# Patient Record
Sex: FEMALE | Race: WHITE
[De-identification: names, ages, dates, MRNs, and addresses within clinical notes are randomized per-mention and may not be internally consistent; named-entity substitution may affect disease eponyms.]

---

## 2017-09-11 ENCOUNTER — HOSPITAL ENCOUNTER (EMERGENCY)
Dept: HOSPITAL 74 - JERFT | Age: 37
Discharge: HOME | End: 2017-09-11
Payer: COMMERCIAL

## 2017-09-11 VITALS — BODY MASS INDEX: 36.3 KG/M2

## 2017-09-11 VITALS — SYSTOLIC BLOOD PRESSURE: 120 MMHG | DIASTOLIC BLOOD PRESSURE: 58 MMHG | TEMPERATURE: 98.2 F | HEART RATE: 83 BPM

## 2017-09-11 DIAGNOSIS — B34.9: Primary | ICD-10-CM

## 2017-09-11 LAB
APPEARANCE UR: CLEAR
BILIRUB UR STRIP.AUTO-MCNC: NEGATIVE MG/DL
COLOR UR: (no result)
KETONES UR QL STRIP: NEGATIVE
NITRITE UR QL STRIP: NEGATIVE
PH UR: 6 [PH] (ref 5–8)
PROT UR QL STRIP: NEGATIVE
PROT UR QL STRIP: NEGATIVE
RBC # UR STRIP: NEGATIVE /UL
SP GR UR: 1.02 (ref 1–1.02)
UROBILINOGEN UR STRIP-MCNC: NEGATIVE MG/DL (ref 0.2–1)

## 2017-09-11 NOTE — PDOC
History of Present Illness





- General


Chief Complaint: Cold Symptoms


Stated Complaint: COUGH, FEVER


Time Seen by Provider: 17 16:11


History Source: Patient


Exam Limitations: No Limitations





- History of Present Illness


Initial Comments: 





17 16:26


36 yr female with c/o cough body aches for 3 days. pt took tylenol yesterday, 

history of asthma no intubations. Pt denies abd pain neg nvd, neg urinary 

complaints. no sick contacts or foreign travel. 


Timing/Duration: reports: other (3 days )


Severity: reports: mild





Past History





- Past Medical History


Allergies/Adverse Reactions: 


 Allergies











Allergy/AdvReac Type Severity Reaction Status Date / Time


 


pineapple [Pineapple] Allergy  Swelling Verified 17 15:26


 


wool Allergy  Rash Uncoded 17 15:26


 


Tape AdvReac  Rash Uncoded 17 15:26











Home Medications: 


Ambulatory Orders





Ibuprofen 600 mg PO TID PRN #25 tablet 17 








Anemia: No


Asthma: Yes


Cancer: No


Cardiac Disorders: No


CVA: No


COPD:  (PLEURISY)


CHF: No


Dementia: No


Diabetes: No


GI Disorders: No


 Disorders: Yes (RENAL COLIC, URINARY STENTS W/ REMOVAL)


HTN: No


Hypercholesterolemia: No


Kidney Stones: Yes


Liver Disease: No


Suicide Attempt (Hx): No


Seizures: No


Thyroid Disease: No





- Surgical History


Abdominal Surgery: Yes


Appendectomy: No


Cardiac Surgery: No


Cholecystectomy: Yes


Lung Surgery: No


Neurologic Surgery: No


Orthopedic Surgery: No





- Reproductive History


LMP comment: 9/3/17


LMP Normal: Yes


Is Patient Pregnant Now?: No


 (#): 7


Para: 6


Therapeutic (s) & number: No


Spontaneous : 0


Uterine Fibroids: Yes (upset at mention of the word hysterectomy. I went to 

further description of)





- Immunization History


Immunization Up to Date: Yes





- Psycho/Social/Smoking Cessation Hx


Anxiety: No


Suicidal Ideation: No


Smoking Status: Yes


Smoking History: Never smoked


Have you smoked in the past 12 months: No


Number of Cigarettes Smoked Daily: 0


'Breaking Loose' booklet given: 13


Hx Alcohol Use: No


Drug/Substance Use Hx: No


Substance Use Type: None


Hx Substance Use Treatment: No





Respiratory Specific PMHX





- Complaint Specific PMHX


Angina: No


Bronchitis: No


Pneumonia: No


Pulmonary Embolus: No


TB (Tuberculosis): No





**Review of Systems





- Review of Systems


Able to Perform ROS?: Yes


Is the patient limited English proficient: No


Constitutional: No: Symptoms Reported


HEENTM: Yes: See HPI


Respiratory: Yes: See HPI





*Physical Exam





- Vital Signs


 Last Vital Signs











Temp Pulse Resp BP Pulse Ox


 


 98.2 F   83   18   120/58   98 


 


 17 15:26  17 15:26  17 15:17 15:17 15:26














- Physical Exam


General Appearance: Yes: Nourished, Appropriately Dressed


HEENT: positive: EOMI, ATA, Normal Voice, TMs Normal, Pharynx Normal.  negative

: Nasal Congestion, Rhinorrhea


Neck: positive: Supple.  negative: Lymphadenopathy (R), Lymphadenopathy (L), 

Tender lateral, Tender midline


Respiratory/Chest: positive: Lungs Clear, Normal Breath Sounds.  negative: 

Chest Tender, Stridor, Wheezing


Cardiovascular: positive: Regular Rhythm, Regular Rate


Gastrointestinal/Abdominal: positive: Normal Bowel Sounds, Soft


Musculoskeletal: positive: Normal Inspection


Extremity: positive: Normal Capillary Refill, Normal Inspection, Normal Range 

of Motion


Integumentary: positive: Normal Color, Dry, Warm


Neurologic: positive: CNs II-XII NML intact, Fully Oriented, Alert, Normal Mood/

Affect, Normal Response, Motor Strength 5/5





Medical Decision Making





- Medical Decision Making





17 16:29


cc: body aches, sore throat, fever


non toxic stable vitals


will check for flu, rapid strep 


motrin for pain 








*DC/Admit/Observation/Transfer


Diagnosis at time of Disposition: 


 Viral syndrome





- Discharge Dispostion


Disposition: HOME


Condition at time of disposition: Good





- Prescriptions


Prescriptions: 


Ibuprofen 600 mg PO TID PRN #25 tablet


 PRN Reason: Fever Or Pain





- Referrals


Referrals: 


[facility] [Provider Group]





- Patient Instructions


Additional Instructions: 


follow with your doctor in 1-2 days for follow up exam


get pleanty of rest


take motrin 600mg every 6hrs for any fever or body aches


increase your vitaminc c level and zinc


return to ER for any worsening symptoms





FLU and strep are negative 





- Post Discharge Activity


Work/School Note:  Back to Work

## 2017-09-14 ENCOUNTER — HOSPITAL ENCOUNTER (EMERGENCY)
Dept: HOSPITAL 74 - JER | Age: 37
Discharge: HOME | End: 2017-09-14
Payer: COMMERCIAL

## 2017-09-14 VITALS — SYSTOLIC BLOOD PRESSURE: 117 MMHG | TEMPERATURE: 99 F | DIASTOLIC BLOOD PRESSURE: 52 MMHG | HEART RATE: 97 BPM

## 2017-09-14 VITALS — BODY MASS INDEX: 36.3 KG/M2

## 2017-09-14 DIAGNOSIS — B95.5: ICD-10-CM

## 2017-09-14 DIAGNOSIS — J02.0: Primary | ICD-10-CM

## 2017-09-14 PROCEDURE — 3E0233Z INTRODUCTION OF ANTI-INFLAMMATORY INTO MUSCLE, PERCUTANEOUS APPROACH: ICD-10-PCS

## 2017-09-14 PROCEDURE — 3E0337Z INTRODUCTION OF ELECTROLYTIC AND WATER BALANCE SUBSTANCE INTO PERIPHERAL VEIN, PERCUTANEOUS APPROACH: ICD-10-PCS

## 2017-09-14 NOTE — PDOC
History of Present Illness





- General


Chief Complaint: Shortness of Breath


Stated Complaint: PAIN, ACUTE


Time Seen by Provider: 17 18:02


History Source: Patient


Exam Limitations: No Limitations





- History of Present Illness


Initial Comments: 





17 18:27


Patient is a 36F with history of asthma (no intubations) here today complaining 

of sore throat. She was seen monday for a sore throat, was strep and flu 

negative, and sent home with instructions to take ibuprofen as needed for pain. 

She has no cough. She endorses fever, and tender cervical pain. She says that it

's painful to swallow, but reports being able to handle her secretions. She 

denies chest pain, shortness of breath, and difficulty with urination.





Past History





- Past Medical History


Allergies/Adverse Reactions: 


 Allergies











Allergy/AdvReac Type Severity Reaction Status Date / Time


 


pineapple [Pineapple] Allergy  Swelling Verified 17 15:26


 


wool Allergy  Rash Uncoded 17 15:26


 


Tape AdvReac  Rash Uncoded 17 15:26











Home Medications: 


Ambulatory Orders





Ibuprofen 600 mg PO TID PRN #25 tablet 17 


Amoxicillin - [Amoxicillin 500mg Capsule -] 500 mg PO BID #19 capsule 17 


Ibuprofen 600 mg PO TID #25 tablet 17 








Anemia: No


Asthma: Yes


Cancer: No


Cardiac Disorders: No


CVA: No


COPD:  (PLEURISY)


CHF: No


Dementia: No


Diabetes: No


GI Disorders: No


 Disorders: Yes (RENAL COLIC, URINARY STENTS W/ REMOVAL)


HTN: No


Hypercholesterolemia: No


Kidney Stones: Yes


Liver Disease: No


Suicide Attempt (Hx): No


Seizures: No


Thyroid Disease: No





- Surgical History


Abdominal Surgery: Yes


Appendectomy: No


Cardiac Surgery: No


Cholecystectomy: Yes


Lung Surgery: No


Neurologic Surgery: No


Orthopedic Surgery: No





- Reproductive History


 (#): 7


Para: 6


Therapeutic (s) & number: No


Spontaneous : 0


Uterine Fibroids: Yes (upset at mention of the word hysterectomy. I went to 

further description of)





- Immunization History


Immunization Up to Date: Yes





- Psycho/Social/Smoking Cessation Hx


Anxiety: No


Suicidal Ideation: No


Smoking Status: Yes


Smoking History: Never smoked


Have you smoked in the past 12 months: No


Number of Cigarettes Smoked Daily: 0


Information on smoking cessation initiated: No


'Breaking Loose' booklet given: 13


Hx Alcohol Use: No


Drug/Substance Use Hx: No


Substance Use Type: None


Hx Substance Use Treatment: No





**Review of Systems





- Review of Systems


Comments:: 





17 18:35


GENERAL/CONSTITUTIONAL: Positive for fever. No weakness.


HEAD, EYES, EARS, NOSE AND THROAT: No change in vision. Positive for sore throat


CARDIOVASCULAR: No chest pain or shortness of breath


RESPIRATORY: No cough, wheezing, or hemoptysis.


GASTROINTESTINAL: Positive for nausea. Negative for vomiting, diarrhea or 

constipation.


GENITOURINARY: No dysuria, frequency, or change in urination.


MUSCULOSKELETAL: Positive for general body aches. No focal joint pain.


SKIN: No rash


NEUROLOGIC: Positive for headache. Negative for vertigo, loss of consciousness, 

or change in strength/sensation.


ENDOCRINE: No increased thirst. No abnormal weight change


HEMATOLOGIC/LYMPHATIC: No anemia, easy bleeding, or history of blood clots.


ALLERGIC/IMMUNOLOGIC: No hives or skin allergy.











*Physical Exam





- Vital Signs


 Last Vital Signs











Temp Pulse Resp BP Pulse Ox


 


 99.2 F   105 H  16   139/86   98 


 


 17 16:45  17 16:45  17 16:45  17 16:45  17 16:45














- Physical Exam


Comments: 





17 18:37


GENERAL: Awake, alert, and fully oriented, in no acute distress


HEAD: No signs of trauma, normocephalic, atraumatic


EYES: PERRLA, EOMI, sclera anicteric, conjunctiva clear


ENT: Auricles normal inspection, hearing grossly normal, nares patent, 

oropharynx has erythema and exudate, uvula midline


NECK: Normal ROM, tender lymphadenopathy, JVD, or masses


LUNGS: No distress, speaks full sentences, clear to auscultation bilaterally


HEART: Regular rate and rhythm, normal S1 and S2, no murmurs, rubs or gallops, 

peripheral pulses normal and equal bilaterally.


ABDOMEN: Soft, nontender, normoactive bowel sounds.  No guarding, no rebound.  

No masses


EXTREMITIES: Normal inspection, Normal range of motion, no edema.  No clubbing 

or cyanosis.


NEUROLOGICAL: Cranial nerves II through XII grossly intact.  Normal speech, 

normal gait, no focal sensorimotor deficits


SKIN: Warm, Dry, normal turgor, no rashes or lesions noted.














Medical Decision Making





- Medical Decision Making


17 18:38


36F with history of asthma here today with sore throat. Tachy to 105, vital 

signs otherwise normal and stable. Will get repeat strep culture. Will give 

toradol im and amoxicillin for CENTOR score of 4. 





17 23:14


Patient given 1L of NS. Now says throat pain has improved to the point where 

she can eat. Feels better. Will discharge home with amoxicillin and ibuprofen.





*DC/Admit/Observation/Transfer


Diagnosis at time of Disposition: 


 Strep pharyngitis





- Discharge Dispostion


Disposition: HOME


Condition at time of disposition: Good


Admit: No





- Prescriptions


Prescriptions: 


Amoxicillin - [Amoxicillin 500mg Capsule -] 500 mg PO BID #19 capsule


Ibuprofen 600 mg PO TID #25 tablet





- Patient Instructions


Printed Discharge Instructions:  DI for Strep Throat


Additional Instructions: 


Your prescription for ibuprofen was sent to [facility] on Monday. Please pick 

that up with your antibiotics.

## 2017-09-14 NOTE — PDOC
Attending Attestation





- Resident


Resident Name: Sheldon Andrade





- ED Attending Attestation


I have performed the following: I have examined & evaluated the patient, The 

case was reviewed & discussed with the resident, I agree w/resident's findings 

& plan, Exceptions are as noted





- HPI


HPI: 





09/14/17 18:31


Healthy 36-year-old female with a past medical history seen here previously for 

throat pain, rapid strep and influenza swabs at that time was negative so she 

was discharged. Now presents with worsening throat pain and chills and painful 

swallowing, no difficulty breathing or managing her secretions or speaking. No 

history of recurring pharyngitis.





- Physicial Exam


PE: 





09/14/17 18:32


Low-grade fever, low-grade tachycardia, otherwise well-appearing seated in 

stretcher


Speaking full sentences, clear voice, no stridor, managing secretions


Mild left tonsillar swelling with exudates, uvula midline, positive 

submandibular and anterior cervical lymphadenopathy





- Medical Decision Making





09/14/17 18:32


Patient seen and evaluated with the resident. I agree with the overall 

evaluation, assessment, and management with the following summary of visit:





36-year-old female with clinical presentation consistent with bacterial 

pharyngitis, low-grade fever and tachycardia. Not ill appearing, 

hemodynamically stable, airway patent.


NSAIDs and Tylenol for pain/fever


Throat culture sent again


Start antibiotics


Understands return criteria

## 2017-09-26 ENCOUNTER — HOSPITAL ENCOUNTER (EMERGENCY)
Dept: HOSPITAL 74 - JERFT | Age: 37
Discharge: HOME | End: 2017-09-26
Payer: COMMERCIAL

## 2017-09-26 VITALS — BODY MASS INDEX: 36.3 KG/M2

## 2017-09-26 VITALS — SYSTOLIC BLOOD PRESSURE: 135 MMHG | DIASTOLIC BLOOD PRESSURE: 78 MMHG | TEMPERATURE: 98.2 F | HEART RATE: 89 BPM

## 2017-09-26 DIAGNOSIS — N76.0: Primary | ICD-10-CM

## 2017-09-26 DIAGNOSIS — T36.0X5A: ICD-10-CM
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
## 2017-09-26 NOTE — PDOC
History of Present Illness





- General


Chief Complaint: Vaginal Sxs


Stated Complaint: ALLERGIC REACTION


Time Seen by Provider: 17 18:07


History Source: Patient


Exam Limitations: No Limitations





- History of Present Illness


Initial Comments: 





17 18:34


Patient is a 36-year-old female currently under treatment with amoxicillin for 

strep throat presents with vaginal itching and discharge.





Past Medical History: Denies.  





Allergies: No known allergies





Medications: Currently on amoxicillin





Family History: Non-contributory





Social History: Denies smoking, alcohol use, or IVDU





Review of Systems


GENERAL/CONSTITUTIONAL: No fever or chills. No weakness. No weight change.


HEAD, EYES, EARS, NOSE AND THROAT: No change in vision. No ear pain or 

discharge. No sore throat. 


CARDIOVASCULAR: No chest pain or shortness of breath.


RESPIRATORY: No cough, wheezing, or hemoptysis.


GASTROINTESTINAL: No nausea, vomiting, diarrhea or constipation. No rectal 

bleeding.


GENITOURINARY: No dysuria, frequency, or change in urination. Vaginal discharge

, external itching


MUSCULOSKELETAL: No joint or muscle swelling or pain. No neck or back pain.


SKIN: No rash or easy bruising.


NEUROLOGIC: No headache, vertigo, loss of consciousness, or loss of sensation.


ENDOCRINE: No increased thirst. No abnormal weight change.


HEMATOLOGIC/LYMPHATIC: No anemia, easy bleeding, or history of blood clots.


ALLERGIC/IMMUNOLOGIC: No hives or skin allergy. No latex allergy.





Physical Exam: 


GENERAL: The patient is awake, alert, and fully oriented, in no acute distress.


EYES: Pupils equal, round and reactive to light, extraocular movements intact, 

sclera anicteric, conjunctiva clear.


ENT: Ears normal, nares patent, oropharynx clear without exudates.  Moist 

mucous membranes. No uvula deviation


NECK: Normal range of motion, supple without lymphadenopathy, JVD, or masses.


LUNGS: Breath sounds equal, clear to auscultation bilaterally.  No wheezes, and 

no crackles.


HEART: Regular rate and rhythm, normal S1 and S2 without murmur, rub or gallop.


ABDOMEN: Soft, nontender, normoactive bowel sounds.  No guarding, no rebound.  

No masses. No bruising or abrasions


GENITALIA: External labia is erythematous without lesions, there is a white non-

foul odor discharge, cervical os is closed, no CMT.


MUSCULOSKELETAL: Normal range of motion, no edema.  No clubbing or cyanosis. No 

cords, erythema, or tenderness. No CVA Tenderness with fist.


NEUROLOGICAL: Cranial nerves II through XII grossly intact.  Normal speech, 

normal gait.


SKIN: Warm, Dry, normal turgor, no rashes or lesions noted.





Past History





- Past Medical History


Allergies/Adverse Reactions: 


 Allergies











Allergy/AdvReac Type Severity Reaction Status Date / Time


 


pineapple [Pineapple] Allergy  Swelling Verified 17 15:26


 


wool Allergy  Rash Uncoded 17 15:26


 


Tape AdvReac  Rash Uncoded 17 15:26











Home Medications: 


Ambulatory Orders





Amoxicillin - [Amoxicillin 500mg Capsule -] 500 mg PO BID #19 capsule 17 


Benzocaine/Resorcinol [Vagisil Cream] 30 gm TP BID #1 tube 17 


Fluconazole [Diflucan] 150 mg PO ONCE #1 tablet 17 








Anemia: No


Asthma: Yes


Cancer: No


Cardiac Disorders: No


CVA: No


COPD:  (PLEURISY)


CHF: No


Dementia: No


Diabetes: No


GI Disorders: No


 Disorders: Yes (RENAL COLIC, URINARY STENTS W/ REMOVAL)


HTN: No


Hypercholesterolemia: No


Kidney Stones: Yes


Liver Disease: No


Seizures: No


Thyroid Disease: No





- Surgical History


Abdominal Surgery: Yes


Appendectomy: No


Cardiac Surgery: No


Cholecystectomy: Yes


Lung Surgery: No


Neurologic Surgery: No


Orthopedic Surgery: No





- Reproductive History


 (#): 7


Para: 6


Therapeutic (s) & number: No


Spontaneous : 0


Uterine Fibroids: Yes (upset at mention of the word hysterectomy. I went to 

further description of)





- Immunization History


Immunization Up to Date: Yes





- Suicide/Smoking/Psychosocial Hx


Smoking Status: Yes


Smoking History: Never smoked


Have you smoked in the past 12 months: No


Number of Cigarettes Smoked Daily: 0


Information on smoking cessation initiated: No


'Breaking Loose' booklet given: 13


Hx Alcohol Use: No


Drug/Substance Use Hx: No


Substance Use Type: None


Hx Substance Use Treatment: No





*Physical Exam





- Vital Signs


 Last Vital Signs











Temp Pulse Resp BP Pulse Ox


 


 98.2 F   89   16   135/78   98 


 


 17 17:38  17 17:38  17 17:38  17 17:38  17 17:38














Medical Decision Making





- Medical Decision Making





17 18:36


A/P: Patient with vaginitis from antibiotic use will DC patient home with 

prescription for vagisill external cream and Diflucan, continue antibiotic 

treatment, follow-up with OB/GYN of symptoms persist in 3 days.





*DC/Admit/Observation/Transfer


Diagnosis at time of Disposition: 


Vaginitis


Qualifiers:


 Chronicity: acute Qualified Code(s): N76.0 - Acute vaginitis





- Discharge Dispostion


Disposition: HOME


Condition at time of disposition: Good


Admit: No





- Prescriptions


Prescriptions: 


Fluconazole [Diflucan] 150 mg PO ONCE #1 tablet


Benzocaine/Resorcinol [Vagisil Cream] 30 gm TP BID #1 tube





- Referrals


Referrals: 


OB,GYN [Other]





- Patient Instructions


Printed Discharge Instructions:  Atrophic Vaginitis


Additional Instructions: 


Please take medication as prescribed and apply topical cream to stop itching.


cool compresses to affected area





- Post Discharge Activity


Forms/Work/School Notes:  Back to Work

## 2017-12-30 ENCOUNTER — HOSPITAL ENCOUNTER (EMERGENCY)
Dept: HOSPITAL 74 - JER | Age: 37
Discharge: HOME | End: 2017-12-30
Payer: COMMERCIAL

## 2017-12-30 VITALS — DIASTOLIC BLOOD PRESSURE: 76 MMHG | SYSTOLIC BLOOD PRESSURE: 137 MMHG | TEMPERATURE: 97.8 F | HEART RATE: 78 BPM

## 2017-12-30 VITALS — BODY MASS INDEX: 39.4 KG/M2

## 2017-12-30 DIAGNOSIS — R09.1: ICD-10-CM

## 2017-12-30 DIAGNOSIS — J02.9: Primary | ICD-10-CM

## 2017-12-30 DIAGNOSIS — B97.89: ICD-10-CM

## 2017-12-30 DIAGNOSIS — J44.9: ICD-10-CM

## 2017-12-30 PROCEDURE — 3E0F7GC INTRODUCTION OF OTHER THERAPEUTIC SUBSTANCE INTO RESPIRATORY TRACT, VIA NATURAL OR ARTIFICIAL OPENING: ICD-10-PCS

## 2017-12-30 NOTE — PDOC
History of Present Illness





- General


History Source: Patient


Exam Limitations: No Limitations





- History of Present Illness


Initial Comments: 





17 14:25


The patient is a 37 year old female with a significant PMH of asthma and 

obesity who presents to the emergency department with cold-like symptoms 

beginning approximately 4 days ago. The patient reports dry cough, sore throat, 

chills, subjective fever and body aches over this 4 day span. The patient 

denies sick contacts or recent travel. 





The patient denies chest pain, shortness of breath, headache and dizziness. 


Denies nausea, vomit, diarrhea and constipation. 


Denies dysuria, frequency, urgency and hematuria.





Allergies: NKDA 


Past surgical history: Cholecystectomy.


Social history: No reported cigarette, alcohol, or drug use.


PCP: None reported.








<Gage Mosher - Last Filed: 17 14:33>





- General


History Source: Patient


Exam Limitations: No Limitations





<Gage Tolbert - Last Filed: 17 15:24>





- General


Chief Complaint: Cold Symptoms


Stated Complaint: COUGH


Time Seen by Provider: 17 12:59





Past History





<Gage Mosher - Last Filed: 17 14:33>





- Past Medical History


Anemia: No


Asthma: Yes


Cancer: No


Cardiac Disorders: No


CVA: No


COPD:  (PLEURISY)


CHF: No


Dementia: No


Diabetes: No


GI Disorders: No


 Disorders: Yes (RENAL COLIC, URINARY STENTS W/ REMOVAL)


HTN: No


Hypercholesterolemia: No


Kidney Stones: Yes


Liver Disease: No


Seizures: No


Thyroid Disease: No





- Surgical History


Abdominal Surgery: No


Appendectomy: No


Cardiac Surgery: No


Cholecystectomy: Yes


Lung Surgery: No


Neurologic Surgery: No


Orthopedic Surgery: No





- Reproductive History


 (#): 7


Para: 6


Therapeutic (s) & number: No


Spontaneous : 0


Uterine Fibroids: Yes (upset at mention of the word hysterectomy. I went to 

further description of)





- Immunization History


Immunization Up to Date: Yes





- Suicide/Smoking/Psychosocial Hx


Smoking Status: Yes


Smoking History: Never smoked


Have you smoked in the past 12 months: No


Number of Cigarettes Smoked Daily: 0


'Breaking Loose' booklet given: 13


Hx Alcohol Use: No


Drug/Substance Use Hx: No


Substance Use Type: None


Hx Substance Use Treatment: No





<Gage Tolbert - Last Filed: 17 15:24>





- Past Medical History


Allergies/Adverse Reactions: 


 Allergies











Allergy/AdvReac Type Severity Reaction Status Date / Time


 


pineapple [Pineapple] Allergy  Swelling Verified 17 12:53


 


wool Allergy  Rash Uncoded 17 12:53


 


Tape AdvReac  Rash Uncoded 17 12:53











Home Medications: 


Ambulatory Orders





Acetaminophen [Tylenol] 650 mg PO Q4H PRN #20 tablet 17 


Albuterol Sulfate Inhaler - [Ventolin Hfa Inhaler -] 1 - 2 inh PO Q4H PRN #1 

inhaler 17 


Benzonatate [Tessalon Pearls -] 100 mg PO TID PRN #21 capsule 17 


Naproxen 500 mg PO BID PRN #20 tablet. 17 











**Review of Systems





- Review of Systems


Able to Perform ROS?: Yes


Comments:: 





17 14:25


GENERAL/CONSTITUTIONAL: (+) Chills. (+) Subjective tactile fever. (+) Body 

aches. No weakness.


HEAD, EYES, EARS, NOSE AND THROAT: (+) Sore throat. No change in vision. No ear 

pain or discharge. 


CARDIOVASCULAR: No chest pain or shortness of breath.


RESPIRATORY: (+) Dry cough. No  wheezing or hemoptysis.


GASTROINTESTINAL: No nausea, vomiting, diarrhea or constipation.


GENITOURINARY: No dysuria, frequency, or change in urination.


MUSCULOSKELETAL: No joint or muscle swelling or pain. No neck or back pain.


SKIN: No rash


NEUROLOGIC: No headache, vertigo, loss of consciousness, or change in strength/

sensation.


ENDOCRINE: No increased thirst. No abnormal weight change.


HEMATOLOGIC/LYMPHATIC: No anemia, easy bleeding, or history of blood clots.


ALLERGIC/IMMUNOLOGIC: No hives or skin allergy.








<Gage Mosher - Last Filed: 17 14:33>





*Physical Exam





- Vital Signs


 Last Vital Signs











Temp Pulse Resp BP Pulse Ox


 


 98.3 F   72   20   148/67   97 


 


 17 12:50  17 12:50  17 12:50  17 12:50  17 12:50














- Physical Exam


Comments: 





17 14:25


GENERAL: Awake, alert, and fully oriented, in no acute distress


HEAD: No signs of trauma


EYES: PERRLA, EOMI, sclera anicteric, conjunctiva clear


ENT: (+) Erythematous oropharynx, no exudates, petechiae, or purulence. 

Auricles normal inspection, hearing grossly normal, nares patent. Moist mucosa


NECK: Normal ROM, supple, no lymphadenopathy, JVD, or masses


LUNGS: Breath sounds equal, clear to auscultation bilaterally.  No wheezes, and 

no crackles


HEART: Regular rate and rhythm, normal S1 and S2, no murmurs, rubs or gallops


ABDOMEN: Soft, nontender, normoactive bowel sounds.  No guarding, no rebound.  

No masses


EXTREMITIES: Normal range of motion, no edema.  No clubbing or cyanosis. No 

cords, erythema, or tenderness


NEUROLOGICAL: Cranial nerves II through XII grossly intact.  Normal speech, 

normal gait


SKIN: Warm, Dry, normal turgor, no rashes or lesions noted.








<Gage Mosher - Last Filed: 17 14:33>





- Vital Signs


 Last Vital Signs











Temp Pulse Resp BP Pulse Ox


 


 98.3 F   72   20   148/67   97 


 


 17 12:50  17 12:50  17 12:50  17 12:50  17 12:50














<Gage Tolbert - Last Filed: 17 15:24>





ED Treatment Course





- Medications


Given in the ED: 


ED Medications














Discontinued Medications














Generic Name Dose Route Start Last Admin





  Trade Name Freq  PRN Reason Stop Dose Admin


 


Albuterol Sulfate  1 amp  17 13:28  17 13:39





  Ventolin 0.083% Nebulizer Soln -  NEB  17 13:29  1 amp





  ONCE ONE   Administration


 


Naproxen  500 mg  17 13:28  17 13:39





  Naprosyn -  PO  17 13:29  500 mg





  ONCE ONE   Administration














<Gage Mosher - Last Filed: 17 14:33>





- Medications


Given in the ED: 


ED Medications














Discontinued Medications














Generic Name Dose Route Start Last Admin





  Trade Name Freq  PRN Reason Stop Dose Admin


 


Albuterol Sulfate  1 amp  17 13:28  17 13:39





  Ventolin 0.083% Nebulizer Soln -  NEB  17 13:29  1 amp





  ONCE ONE   Administration


 


Naproxen  500 mg  17 13:28  17 13:39





  Naprosyn -  PO  17 13:29  500 mg





  ONCE ONE   Administration














<Gage Tolbert - Last Filed: 17 15:24>





Medical Decision Making





- Medical Decision Making





17 13:47





A portion of this note was documented by scribe services under my direction. I 

have reviewed the details of the note, within reason, and agree with the 

documentation with the following case summary and management plan written by 

me. 





Patient treated in the ED.





Nursing notes are reviewed and incorporated into the medical decision-making.


Vital signs reviewed.





Peripheral IV access obtained by the nurse, laboratory studies are drawn and 

sent, reviewed and interpreted by myself. 





 Vital Signs











Temp Pulse Resp BP Pulse Ox


 


 98.3 F   72   20   148/67   97 


 


 17 12:50  17 12:50  17 12:50  17 12:50  17 12:50








37-year-old female with past medical history obesity, asthma presents with 4 

days of sore throat, nonproductive cough, chills, body aches, tactile fevers. 

Denies sick contacts or recent travels. States that she has not taken any 

medications and does not believe she is wheezing. Stated she felt general 

malaise and came to the ED.





Patient overall is nontoxic appearing. Differential includes viral pharyngitis, 

influenza, viral syndrome, strep throat. We'll obtain a rapid strep and 

influenza swab. NSAIDs and reassess.


17 15:19





Influenza and rapid strep negative.





Likely viral syndrome and viral pharyngitis.


Supportive care





I discussed the physical exam findings, ancillary test results and final 

diagnoses with the patient.  I answered all of the patient's questions.  The 

patient was satisfied with the care received and felt comfortable with the 

discharge plan and treatment plan.  The patient will call their primary care 

physician within 24 hours to arrange follow-up and will return to the Emergency 

Department with any new, persistant or worsening symptoms. 





<Gage Tolbert - Last Filed: 17 15:24>





*DC/Admit/Observation/Transfer





- Attestations


Scribe Attestion: 





17 14:26





Documentation prepared by Gage Mosher, acting as medical scribe for Gage Tolbert MD.





<Gage Mosher - Last Filed: 17 14:33>





- Discharge Dispostion


Admit: No





<Gage Tolbert - Last Filed: 17 15:24>


Diagnosis at time of Disposition: 


 Viral pharyngitis, Viral syndrome








- Discharge Dispostion


Disposition: HOME


Condition at time of disposition: Stable





- Prescriptions


Prescriptions: 


Acetaminophen [Tylenol] 650 mg PO Q4H PRN #20 tablet


 PRN Reason: Pain/fever


Albuterol Sulfate Inhaler - [Ventolin Hfa Inhaler -] 1 - 2 inh PO Q4H PRN #1 

inhaler


 PRN Reason: Cough


Benzonatate [Tessalon Pearls -] 100 mg PO TID PRN #21 capsule


 PRN Reason: Cough


Naproxen 500 mg PO BID PRN #20 tablet.dr


 PRN Reason: Pain/Fever





- Patient Instructions


Printed Discharge Instructions:  DI for Viral Pharyngitis, DI for Viral Syndrome


Additional Instructions: 


Your rapid strep and influenza test is negative.


Take 500 mg naproxen every 12 hours as needed for pain or fever.


For additional relief, take 650 mg tylenol every 4 hours as needed.


Drink plenty of fluids and rest.


It may take several days before your symptoms improve.


Follow up with your doctor,.





- Post Discharge Activity


Forms/Work/School Notes:  Back to Work

## 2018-02-21 ENCOUNTER — HOSPITAL ENCOUNTER (EMERGENCY)
Dept: HOSPITAL 74 - JER | Age: 38
Discharge: LEFT BEFORE DISCHARGE | End: 2018-02-21
Payer: COMMERCIAL

## 2018-02-21 ENCOUNTER — HOSPITAL ENCOUNTER (EMERGENCY)
Dept: HOSPITAL 74 - FER | Age: 38
Discharge: HOME | End: 2018-02-21
Payer: COMMERCIAL

## 2018-02-21 VITALS — DIASTOLIC BLOOD PRESSURE: 99 MMHG | TEMPERATURE: 98.9 F | HEART RATE: 103 BPM | SYSTOLIC BLOOD PRESSURE: 154 MMHG

## 2018-02-21 VITALS — TEMPERATURE: 98.5 F | DIASTOLIC BLOOD PRESSURE: 84 MMHG | SYSTOLIC BLOOD PRESSURE: 121 MMHG | HEART RATE: 96 BPM

## 2018-02-21 VITALS — BODY MASS INDEX: 30 KG/M2

## 2018-02-21 DIAGNOSIS — J45.909: ICD-10-CM

## 2018-02-21 DIAGNOSIS — B97.89: ICD-10-CM

## 2018-02-21 DIAGNOSIS — J20.8: Primary | ICD-10-CM

## 2018-02-21 DIAGNOSIS — Z53.21: Primary | ICD-10-CM

## 2018-02-21 PROCEDURE — 3E0F7GC INTRODUCTION OF OTHER THERAPEUTIC SUBSTANCE INTO RESPIRATORY TRACT, VIA NATURAL OR ARTIFICIAL OPENING: ICD-10-PCS

## 2018-02-21 RX ADMIN — IPRATROPIUM BROMIDE AND ALBUTEROL SULFATE SCH AMP: .5; 3 SOLUTION RESPIRATORY (INHALATION) at 16:20

## 2018-02-21 RX ADMIN — IPRATROPIUM BROMIDE AND ALBUTEROL SULFATE SCH AMP: .5; 3 SOLUTION RESPIRATORY (INHALATION) at 15:41

## 2018-02-21 RX ADMIN — IPRATROPIUM BROMIDE AND ALBUTEROL SULFATE SCH AMP: .5; 3 SOLUTION RESPIRATORY (INHALATION) at 16:01

## 2018-02-21 NOTE — PDOC
History of Present Illness





- General


History Source: Patient





<JonathanRosemarieus CARIAS - Last Filed: 18 17:10>





- General


History Source: Patient (The patient is a 37 year old female, with a 

significant past medical history of asthma who presents to the emergency 

department via walk-in with, approx 3 days of productive cough with clear and 

brown sputum, fever, chills, generalized body aches and weakness. The patient 

reports a measured temperature of 101 F last night. The patient reports she 

took Tylenol for the fever with mild relief. The patient also reports mild 

shortness of breath described as a chest tightness that feels similar to her 

asthma. She denies any recent sick contacts. She denies any recent nausea, vomit

, diarrhea or constipation. She denies any recent dysuria, urgency, frequency 

or urgency. She denies any recent chest pain, palpitations, lightheadedness, 

swelling or calf tenderness. )


Exam Limitations: No Limitations





<Haider Contreras - Last Filed: 18 17:18>





- General


Chief Complaint: Cold Symptoms


Stated Complaint: COUGH,FLU SYMPTOMS


Time Seen by Provider: 18 14:41





Past History





- Past Medical History


Anemia: No


Asthma: Yes


Cancer: No


Cardiac Disorders: No


CVA: No


COPD: No (PLEURISY)


CHF: No


Dementia: No


Diabetes: No


GI Disorders: No


 Disorders: Yes (RENAL COLIC, URINARY STENTS W/ REMOVAL)


HTN: No


Hypercholesterolemia: No


Kidney Stones: Yes


Liver Disease: No


Seizures: No


Thyroid Disease: No





- Surgical History


Abdominal Surgery: No


Appendectomy: No


Cardiac Surgery: No


Cholecystectomy: Yes


Lung Surgery: No


Neurologic Surgery: No


Orthopedic Surgery: No





- Reproductive History


 (#): 7


Para: 6


Therapeutic (s) & number: No


Spontaneous : 0


Uterine Fibroids: Yes (upset at mention of the word hysterectomy. I went to 

further description of)





- Immunization History


Immunization Up to Date: Yes





- Suicide/Smoking/Psychosocial Hx


Smoking Status: Yes


Smoking History: Never smoked


Have you smoked in the past 12 months: No


Number of Cigarettes Smoked Daily: 0


Information on smoking cessation initiated: No


'Breaking Loose' booklet given: 13


Hx Alcohol Use: No


Drug/Substance Use Hx: No


Substance Use Type: None


Hx Substance Use Treatment: No





<Moucha,Remus S - Last Filed: 18 17:10>





<Haider Contreras - Last Filed: 18 17:18>





- Past Medical History


Allergies/Adverse Reactions: 


 Allergies











Allergy/AdvReac Type Severity Reaction Status Date / Time


 


pineapple [Pineapple] Allergy  Swelling Verified 18 14:41


 


wool Allergy  Rash Uncoded 18 14:41


 


Tape AdvReac  Rash Uncoded 18 14:41











Home Medications: 


Ambulatory Orders





Albuterol Sulfate Inhaler - [Ventolin Hfa Inhaler -] 1 - 2 inh PO Q4H PRN #1 

inhaler 17 


Acetaminophen [Tylenol] 650 mg PO ONCE PRN 18 


Azithromycin [Zithromax Tri-Earl (3 DAYS) -] 500 mg PO DAILY #3 tablet 18 


Cetirizine HCl/Pseudoephedrine [Zyrtec-D Tablet] 1 each PO BID #14 tab.er.12h  











**Review of Systems





- Review of Systems


Constitutional: Yes: Chills, Fever, Weakness.  No: Diaphoresis


HEENTM: No: Eye Pain, Blurred Vision, Double Vision, Difficulty Swallowing


Respiratory: Yes: Shortness of Breath, Productive cough.  No: Wheezing, 

Hemoptysis


Cardiac (ROS): No: Chest Pain, Edema, Irregular Heart Rate, Lightheadedness, 

Palpitations, Syncope


ABD/GI: No: Abdominal Distended, Constipated, Diarrhea, Nausea, Vomiting


: No: Burning, Dysuria, Discharge, Frequency, Hematuria


Musculoskeletal: No: Back Pain, Joint Pain


Integumentary: No: Lesions, Rash


Neurological: Yes: Headache.  No: Numbness, Paresthesia, Dizziness


Psychiatric: No: Anxiety, Depression


Endocrine: No: Intolerance to Cold, Intolerance to Heat, Unexplained Weight Gain

, Unexplained Weight Loss


Hematologic/Lymphatic: No: Easy Bleeding, Easy Bruising





<Haider Contreras - Last Filed: 18 17:18>





*Physical Exam





- Vital Signs


 Last Vital Signs











Temp Pulse Resp BP Pulse Ox


 


 98.9 F   103 H  20   154/99   96 


 


 18 14:40  18 14:40  18 14:40  18 14:40  18 14:40














<Moucha,Remus S - Last Filed: 18 17:10>





- Vital Signs


 Last Vital Signs











Temp Pulse Resp BP Pulse Ox


 


 98.9 F   103 H  20   154/99   96 


 


 18 14:40  18 14:40  18 14:40  18 14:40  18 14:40














- Physical Exam


General Appearance: Yes: Appropriately Dressed.  No: Apparent Distress


HEENT: positive: EOMI, ATA, Normal ENT Inspection, Normal Voice, Symmetrical, 

TMs Normal, Pharynx Normal


Neck: positive: Trachea midline, Supple.  negative: Tender


Respiratory/Chest: positive: Lungs Clear, Normal Breath Sounds.  negative: 

Respiratory Distress, Accessory Muscle Use, Crackles, Rales, Rhonchi, Wheezing


Cardiovascular: positive: Regular Rhythm, S1, S2, Tachycardia.  negative: Edema

, JVD, Murmur


Gastrointestinal/Abdominal: positive: Normal Bowel Sounds, Soft.  negative: 

Tender, Distended, Guarding, Rebound


Musculoskeletal: positive: Normal Inspection.  negative: CVA Tenderness


Extremity: positive: Normal Inspection, Normal Range of Motion.  negative: 

Tender, Swelling, Calf Tenderness


Integumentary: positive: Normal Color, Dry, Warm


Neurologic: positive: CNs II-XII NML intact, Fully Oriented, Alert, Normal Mood/

Affect, Normal Response, Motor Strength 5/5





<Haider Contreras - Last Filed: 18 17:18>





ED Treatment Course





- RADIOLOGY


Radiograph Interpretation: 





18 17:18


EXAM#:     TYPE/EXAM: RESULT: 


[id number] RAD/CHEST PA   LAT 


Clinical information: Cough and fever. 


 


 Chest x ray, PA and Lateral 


 


 Comparison: Prior chest x-ray dated 1/3/2013 


 


 The cardiac silhouette is within normal limits in size. The lung is clear. 

Mediastinum and 


visualized osseous structures appear grossly intact . Note is made of 

postcholecystectomy surgical 


clips in the right upper abdomen. 


 


 Impression 


 


 Unremarkable  examination without evidence of acute lung disease. 


 


 Reported By: Eric Santana MD 








<Haider Contreras - Last Filed: 18 17:18>





*DC/Admit/Observation/Transfer





- Discharge Dispostion


Admit: No





<Terrie Castillo - Last Filed: 18 17:10>





- Attestations


Scribe Attestion: 





18 15:34





Documentation prepared by Haider Contreras, acting as medical scribe for Terrie Castillo MD.





<Haider Contreras - Last Filed: 18 17:18>


Diagnosis at time of Disposition: 


 Viral disease, Bronchitis








- Discharge Dispostion


Disposition: HOME


Condition at time of disposition: Improved





- Prescriptions


Prescriptions: 


Azithromycin [Zithromax Tri-Earl (3 DAYS) -] 500 mg PO DAILY #3 tablet


Cetirizine HCl/Pseudoephedrine [Zyrtec-D Tablet] 1 each PO BID #14 tab.er.12h





- Patient Instructions


Printed Discharge Instructions:  DI for Acute Bronchitis





- Post Discharge Activity


Forms/Work/School Notes:  Back to Work

## 2019-02-06 ENCOUNTER — HOSPITAL ENCOUNTER (EMERGENCY)
Dept: HOSPITAL 74 - JER | Age: 39
Discharge: HOME | End: 2019-02-06
Payer: COMMERCIAL

## 2019-02-06 VITALS — TEMPERATURE: 98.4 F | DIASTOLIC BLOOD PRESSURE: 74 MMHG | SYSTOLIC BLOOD PRESSURE: 119 MMHG | HEART RATE: 102 BPM

## 2019-02-06 VITALS — BODY MASS INDEX: 89.6 KG/M2

## 2019-02-06 DIAGNOSIS — N83.292: ICD-10-CM

## 2019-02-06 DIAGNOSIS — Z3A.09: ICD-10-CM

## 2019-02-06 DIAGNOSIS — O34.81: ICD-10-CM

## 2019-02-06 DIAGNOSIS — R10.32: ICD-10-CM

## 2019-02-06 DIAGNOSIS — O26.891: Primary | ICD-10-CM

## 2019-02-06 LAB
ALBUMIN SERPL-MCNC: 3.5 G/DL (ref 3.4–5)
ALP SERPL-CCNC: 87 U/L (ref 45–117)
ALT SERPL-CCNC: 24 U/L (ref 13–61)
ANION GAP SERPL CALC-SCNC: 8 MMOL/L (ref 8–16)
APPEARANCE UR: (no result)
AST SERPL-CCNC: 10 U/L (ref 15–37)
BASOPHILS # BLD: 0.6 % (ref 0–2)
BILIRUB SERPL-MCNC: 0.4 MG/DL (ref 0.2–1)
BILIRUB UR STRIP.AUTO-MCNC: NEGATIVE MG/DL
BUN SERPL-MCNC: 10 MG/DL (ref 7–18)
CALCIUM SERPL-MCNC: 8.8 MG/DL (ref 8.5–10.1)
CHLORIDE SERPL-SCNC: 105 MMOL/L (ref 98–107)
CO2 SERPL-SCNC: 24 MMOL/L (ref 21–32)
COLOR UR: YELLOW
CREAT SERPL-MCNC: 0.5 MG/DL (ref 0.55–1.3)
DEPRECATED RDW RBC AUTO: 13.8 % (ref 11.6–15.6)
EOSINOPHIL # BLD: 1.5 % (ref 0–4.5)
GLUCOSE SERPL-MCNC: 165 MG/DL (ref 74–106)
HCT VFR BLD CALC: 41.1 % (ref 32.4–45.2)
HGB BLD-MCNC: 14.5 GM/DL (ref 10.7–15.3)
INR BLD: 1.01 (ref 0.83–1.09)
KETONES UR QL STRIP: NEGATIVE
LEUKOCYTE ESTERASE UR QL STRIP.AUTO: NEGATIVE
LYMPHOCYTES # BLD: 23.8 % (ref 8–40)
MCH RBC QN AUTO: 30.8 PG (ref 25.7–33.7)
MCHC RBC AUTO-ENTMCNC: 35.3 G/DL (ref 32–36)
MCV RBC: 87.4 FL (ref 80–96)
MONOCYTES # BLD AUTO: 4.6 % (ref 3.8–10.2)
NEUTROPHILS # BLD: 69.5 % (ref 42.8–82.8)
NITRITE UR QL STRIP: NEGATIVE
PH UR: 5 [PH] (ref 5–8)
PLATELET # BLD AUTO: 282 K/MM3 (ref 134–434)
PMV BLD: 8.2 FL (ref 7.5–11.1)
POTASSIUM SERPLBLD-SCNC: 3.9 MMOL/L (ref 3.5–5.1)
PROT SERPL-MCNC: 7 G/DL (ref 6.4–8.2)
PROT UR QL STRIP: (no result)
PROT UR QL STRIP: NEGATIVE
PT PNL PPP: 11.9 SEC (ref 9.7–13)
RBC # BLD AUTO: 4.7 M/MM3 (ref 3.6–5.2)
SODIUM SERPL-SCNC: 137 MMOL/L (ref 136–145)
SP GR UR: 1.02 (ref 1.01–1.03)
UROBILINOGEN UR STRIP-MCNC: NEGATIVE MG/DL (ref 0.2–1)
WBC # BLD AUTO: 14.1 K/MM3 (ref 4–10)

## 2019-02-06 PROCEDURE — 3E0337Z INTRODUCTION OF ELECTROLYTIC AND WATER BALANCE SUBSTANCE INTO PERIPHERAL VEIN, PERCUTANEOUS APPROACH: ICD-10-PCS

## 2019-02-06 PROCEDURE — 3E033GC INTRODUCTION OF OTHER THERAPEUTIC SUBSTANCE INTO PERIPHERAL VEIN, PERCUTANEOUS APPROACH: ICD-10-PCS

## 2019-02-06 NOTE — PDOC
History of Present Illness





- General


Chief Complaint: Vaginal Bleeding


Stated Complaint: WEAKNESS


Time Seen by Provider: 19 15:45





Past History





- Travel


Traveled outside of the country in the last 30 days: No


Close contact w/someone who was outside of country & ill: No





- Past Medical History


Allergies/Adverse Reactions: 


 Allergies











Allergy/AdvReac Type Severity Reaction Status Date / Time


 


pineapple [Pineapple] Allergy  Swelling Verified 18 14:41


 


wool Allergy  Rash Uncoded 18 14:41


 


Tape AdvReac  Rash Uncoded 18 14:41











Home Medications: 


Ambulatory Orders





Albuterol Sulfate Inhaler - [Ventolin Hfa Inhaler -] 1 - 2 inh PO Q4H PRN #1 

inhaler 17 


Acetaminophen [Tylenol] 650 mg PO ONCE PRN 18 


Azithromycin [Zithromax Tri-Earl (3 DAYS) -] 500 mg PO DAILY #3 tablet 18 


Cetirizine HCl/Pseudoephedrine [Zyrtec-D Tablet] 1 each PO BID #14 tab.er.12h  








Anemia: No


Asthma: Yes


Cancer: No


Cardiac Disorders: No


CVA: No


COPD: No (PLEURISY)


CHF: No


Dementia: No


Diabetes: No


GI Disorders: No


 Disorders: Yes (RENAL COLIC, URINARY STENTS W/ REMOVAL)


HTN: No


Hypercholesterolemia: No


Kidney Stones: Yes


Liver Disease: No


Seizures: No


Thyroid Disease: No





- Surgical History


Abdominal Surgery: No


Appendectomy: No


Cardiac Surgery: No


Cholecystectomy: Yes


Lung Surgery: No


Neurologic Surgery: No


Orthopedic Surgery: No





- Reproductive History


 (#): 7


Para: 6


Therapeutic (s) & number: No


Spontaneous : 0


Uterine Fibroids: Yes (upset at mention of the word hysterectomy. I went to 

further description of)





- Immunization History


Immunization Up to Date: Yes





- Suicide/Smoking/Psychosocial Hx


Smoking Status: Yes


Smoking History: Never smoked


Have you smoked in the past 12 months: No


Number of Cigarettes Smoked Daily: 0


Information on smoking cessation initiated: No


'Breaking Loose' booklet given: 13


Hx Alcohol Use: No


Drug/Substance Use Hx: No


Substance Use Type: None


Hx Substance Use Treatment: No





**Review of Systems





- Review of Systems


Able to Perform ROS?: Yes


Comments:: 





19 17:41


CONSTITUTIONAL: 


Absent: fever, chills, diaphoresis, generalized weakness, malaise, loss of 

appetite


HEENT: 


Absent: rhinorrhea, nasal congestion, throat pain, throat swelling, difficulty 

swallowing, mouth swelling, ear pain, eye pain, visual Changes


CARDIOVASCULAR: 


Absent: chest pain, loss of consciousness, palpitations, irregular heart rate, 

peripheral edema


RESPIRATORY: 


Absent: cough, shortness of breath, dyspnea with exertion, orthopnea, wheezing, 

stridor, hemoptysis


GASTROINTESTINAL:


Present: abdominal pain, nausea Absent: abdominal pain, abdominal distension, 

nausea, vomiting, diarrhea, constipation, melena, hematochezia


GENITOURINARY: 


Present: vaginal spotting one month ago Absent: dysuria, frequency, urgency, 

hesitancy, hematuria, flank pain, genital pain


MUSCULOSKELETAL: 


Absent: myalgia, arthralgia, joint swelling


SKIN: 


Absent: rash, itching, pallor


HEMATOLOGIC/IMMUNOLOGIC: 


Absent: easy bleeding, easy bruising, lymphadenopathy, frequent infections


ENDOCRINE:


Absent: unexplained weight gain, unexplained weight loss, heat intolerance, 

cold intolerance


NEUROLOGIC: 


Absent: headache, focal weakness or paresthesias, dizziness, unsteady gait, 

seizure, mental status changes, bladder or bowel incontinence


PSYCHIATRIC: 


Absent: anxiety, depression, suicidal or homicidal ideation, hallucinations.


Is the patient limited English proficient: No





*Physical Exam





- Vital Signs


 Last Vital Signs











Temp Pulse Resp BP Pulse Ox


 


 98.4 F   102 H  18   119/74   100 


 


 19 15:46  19 15:46  19 15:46  19 15:46  19 15:46














- Physical Exam


Comments: 





19 17:42


GENERAL:


Well developed, well nourished. Awake and alert. No acute distress.


HEENT:


Normocephalic, atraumatic. PERRLA, EOMI. No conjunctival pallor. Sclera are non-

icteric. Moist mucous membranes. Oropharynx is clear.


NECK: 


Supple. Full ROM. No JVD. Carotid pulses 2+ and symmetric, without bruits. No 

thyromegaly. No lymphadenopathy.


CARDIOVASCULAR:


Regular rate and rhythm. No murmurs, rubs, or gallops. Distal pulses are 2+ and 

symmetric. 


PULMONARY: 


No evidence of respiratory distress. Lungs clear to auscultation bilaterally. 

No wheezing, rales or rhonchi.


ABDOMINAL:


Soft. Non-tender. Non-distended. No rebound or guarding. No organomegaly. 

Normoactive bowel sounds. 


MUSCULOSKELETAL 


Normal range of motion at all joints. No bony deformities or tenderness. No CVA 

tenderness.


EXTREMITIES: 


No cyanosis. No clubbing. No edema. No calf tenderness.


SKIN: 


Warm and dry. Normal capillary refill. No rashes. No jaundice. 


NEUROLOGICAL: 


Alert, awake, appropriate. Cranial nerves 2-12 intact. No deficits to light 

touch and temperature in face, upper extremities and lower extremities. No 

motor deficits in the in face, upper extremities and lower extremities. 

Normoreflexic in the upper and lower extremities. Normal speech. Toes are down-

going bilaterally. Gait is normal without ataxia.


PSYCHIATRIC: 


Cooperative. Good eye contact. Appropriate mood and affect.





Moderate Sedation





- Procedure Monitoring


Vital Signs: 


Procedure Monitoring Vital Signs











Temperature  98.4 F   19 15:46


 


Pulse Rate  102 H  19 15:46


 


Respiratory Rate  18   19 15:46


 


Blood Pressure  119/74   19 15:46


 


O2 Sat by Pulse Oximetry (%)  100   19 15:46











ED Treatment Course





- ADDITIONAL ORDERS


Additional order review: 


 Laboratory  Results











  19





  16:31


 


Urine HCG, Qual  Positive














- RADIOLOGY


Radiology Studies Ordered: 














 Category Date Time Status


 


 TRANSVAGINAL US PREG [US] Stat Ultrasound  19 17:20 Ordered














*DC/Admit/Observation/Transfer


Diagnosis at time of Disposition: 


 Dizziness








- Referrals





- Patient Instructions





- Post Discharge Activity

## 2019-02-06 NOTE — PDOC
Rapid Medical Evaluation


Time Seen by Provider: 19 15:45


Medical Evaluation: 


 Allergies











Allergy/AdvReac Type Severity Reaction Status Date / Time


 


pineapple [Pineapple] Allergy  Swelling Verified 18 14:41


 


wool Allergy  Rash Uncoded 18 14:41


 


Tape AdvReac  Rash Uncoded 18 14:41











19 15:49





I have performed a brief in-person evaluation of this patient.





The patient presents with a chief complaint of: Intermittent dizziness w/ 

nausea x 1 week. LMP  (not sure what date). Thinks she might be pregnant. 

 (s/p 1 ectopic s/p surgery, 1 spon ab)





Pertinent physical exam findings:Stable, andrew uncomfortable





I have ordered the following:upreg





The patient will proceed to the ED for further evaluation.





**Discharge Disposition





- Diagnosis


 Dizziness








- Referrals





- Patient Instructions





- Post Discharge Activity

## 2019-10-04 ENCOUNTER — HOSPITAL ENCOUNTER (EMERGENCY)
Dept: HOSPITAL 74 - JERFT | Age: 39
Discharge: HOME | End: 2019-10-04
Payer: COMMERCIAL

## 2019-10-04 VITALS — BODY MASS INDEX: 39.1 KG/M2

## 2019-10-04 VITALS — HEART RATE: 84 BPM | DIASTOLIC BLOOD PRESSURE: 82 MMHG | SYSTOLIC BLOOD PRESSURE: 139 MMHG | TEMPERATURE: 98.1 F

## 2019-10-04 DIAGNOSIS — Z91.048: ICD-10-CM

## 2019-10-04 DIAGNOSIS — Z87.442: ICD-10-CM

## 2019-10-04 DIAGNOSIS — G89.29: ICD-10-CM

## 2019-10-04 DIAGNOSIS — M54.5: Primary | ICD-10-CM

## 2019-10-04 DIAGNOSIS — Z87.09: ICD-10-CM

## 2019-10-04 DIAGNOSIS — Z88.0: ICD-10-CM

## 2019-10-04 NOTE — PDOC
Rapid Medical Evaluation


Time Seen by Provider: 10/04/19 13:13


Medical Evaluation: 


 Allergies











Allergy/AdvReac Type Severity Reaction Status Date / Time


 


pineapple [Pineapple] Allergy  Swelling Verified 02/06/19 19:49


 


wool Allergy  Rash Uncoded 02/06/19 19:49


 


Tape AdvReac  Rash Uncoded 02/06/19 19:49











10/04/19 13:14


I have performed a brief in-person evaluation of this patient.





The patient presents with a chief complaint of: Low back pain. H/o chronic pain 

and sciatica. Pt has not taken any meds for symptoms. No urinary symptoms.





The patient will proceed to the ED for further evaluation.





**Discharge Disposition





- Diagnosis


Back pain


Qualifiers:


 Back pain location: low back pain Chronicity: unspecified Back pain laterality

: unspecified Sciatica presence: unspecified whether sciatica present Qualified 

Code(s): M54.5 - Low back pain








- Referrals





- Patient Instructions





- Post Discharge Activity

## 2019-10-04 NOTE — PDOC
History of Present Illness





- General


Chief Complaint: Chronic pain


Stated Complaint: LOWER BACK PAIN


Time Seen by Provider: 10/04/19 13:13


History Source: Patient





- History of Present Illness


Occurred: reports: other


Severity: reports: moderate


Pain Location: reports: back





Past History





- Past Medical History


Allergies/Adverse Reactions: 


 Allergies











Allergy/AdvReac Type Severity Reaction Status Date / Time


 


pineapple [Pineapple] Allergy  Swelling Verified 10/04/19 13:14


 


wool Allergy  Rash Uncoded 10/04/19 13:14


 


Tape AdvReac  Rash Uncoded 10/04/19 13:14











Home Medications: 


Ambulatory Orders





Cyclobenzaprine HCl [Flexeril 10 mg] 10 mg PO HS #9 tablet 10/04/19 


Ibuprofen [Motrin -] 600 mg PO QID #28 tablet 10/04/19 








Anemia: No


Asthma: Yes


Cancer: No


Cardiac Disorders: No


CVA: No


COPD: No (PLEURISY)


CHF: No


Dementia: No


Diabetes: No


GI Disorders: No


 Disorders: Yes (RENAL COLIC, URINARY STENTS W/ REMOVAL)


HTN: No


Hypercholesterolemia: No


Kidney Stones: Yes


Liver Disease: No


Seizures: No


Thyroid Disease: No





- Surgical History


Abdominal Surgery: No


Appendectomy: No


Cardiac Surgery: No


Cholecystectomy: Yes


Lung Surgery: No


Neurologic Surgery: No


Orthopedic Surgery: No





- Reproductive History


 (#): 7


Para: 6


Therapeutic (s) & number: No


Spontaneous : 0


Uterine Fibroids: Yes (upset at mention of the word hysterectomy. I went to 

further description of)





- Immunization History


Immunization Up to Date: Yes





- Psycho Social/Smoking Cessation Hx


Smoking Status: Yes


Smoking History: Unknown if ever smoked


Have you smoked in the past 12 months: No


Number of Cigarettes Smoked Daily: 0


'Breaking Loose' booklet given: 13


Hx Alcohol Use: No


Drug/Substance Use Hx: No


Substance Use Type: None


Hx Substance Use Treatment: No





**Review of Systems





- Review of Systems


Constitutional: No: Chills, Fever


ABD/GI: No: Nausea, Vomiting, Abdominal cramping


: No: Dysuria, Flank Pain, Hematuria


Musculoskeletal: Yes: Back Pain


Neurological: No: Numbness, Tingling, Weakness





*Physical Exam





- Vital Signs


 Last Vital Signs











Temp Pulse Resp BP Pulse Ox


 


 98.1 F   84   18   139/82   98 


 


 10/04/19 13:16  10/04/19 13:16  10/04/19 13:16  10/04/19 13:16  10/04/19 13:16














- Physical Exam


General Appearance: Yes: Appropriately Dressed.  No: Apparent Distress


HEENT: positive: Normal Voice


Neck: positive: Supple


Respiratory/Chest: negative: Respiratory Distress


Gastrointestinal/Abdominal: positive: Soft.  negative: Tender


Musculoskeletal: negative: CVA Tenderness, Vertebral Tenderness


Extremity: positive: Normal Inspection


Integumentary: positive: Dry, Warm


Neurologic: positive: Fully Oriented, Alert, Normal Mood/Affect, Motor Strength 

/5





Medical Decision Making





- Medical Decision Making





10/04/19 13:42





38-year-old female, unclear psych hx, multiple chronic pain syndromes including 

arthritis and chronic lower back pain, L sided sciatica per patient ,has had 

MRI in the past but not certain results, lost to follow-up with ortho spine and 

pain management and has no pain meds at home, here with her usual left lower 

back pain x several days radiating to left thigh.  No trauma. No new sensory 

changes, lower extremity weakness, bowel or bladder incontinence or saddle 

anesthesia. No  sxs, n/v/f/c





see exam





Acute on chronic LBP


No red flags today


Lost to f/u


No pain meds at home


-Dc w/ pain control and referral to ortho spine








Discharge





- Discharge Information


Problems reviewed: Yes


Clinical Impression/Diagnosis: 


Chronic back pain


Qualifiers:


 Back pain location: low back pain Back pain laterality: left Sciatica presence

: unspecified whether sciatica present Qualified Code(s): M54.5 - Low back pain





Condition: Good


Disposition: HOME





- Additional Discharge Information


Prescriptions: 


Cyclobenzaprine HCl [Flexeril 10 mg] 10 mg PO HS #9 tablet


Ibuprofen [Motrin -] 600 mg PO QID #28 tablet





- Follow up/Referral


Referrals: 


Geoff Chavez MD [Staff Physician] - 





- Patient Discharge Instructions


Additional Instructions: 


Take medications as prescribed and follow-up with Dr. chavez of ortho spine





- Post Discharge Activity

## 2019-12-04 ENCOUNTER — HOSPITAL ENCOUNTER (EMERGENCY)
Dept: HOSPITAL 74 - JER | Age: 39
Discharge: HOME | End: 2019-12-04
Payer: COMMERCIAL

## 2019-12-04 VITALS — BODY MASS INDEX: 39.4 KG/M2

## 2019-12-04 VITALS — SYSTOLIC BLOOD PRESSURE: 133 MMHG | HEART RATE: 93 BPM | TEMPERATURE: 98.7 F | DIASTOLIC BLOOD PRESSURE: 71 MMHG

## 2019-12-04 DIAGNOSIS — N30.00: ICD-10-CM

## 2019-12-04 DIAGNOSIS — R05: Primary | ICD-10-CM

## 2019-12-04 DIAGNOSIS — Z91.09: ICD-10-CM

## 2019-12-04 DIAGNOSIS — Z87.442: ICD-10-CM

## 2019-12-04 DIAGNOSIS — Z91.018: ICD-10-CM

## 2019-12-04 LAB
APPEARANCE UR: CLEAR
BACTERIA # UR AUTO: 210.1 /HPF
BILIRUB UR STRIP.AUTO-MCNC: NEGATIVE MG/DL
CASTS URNS QL MICRO: 10 /LPF (ref 0–8)
COLOR UR: YELLOW
EPITH CASTS URNS QL MICRO: 4.6 /HPF
KETONES UR QL STRIP: NEGATIVE
LEUKOCYTE ESTERASE UR QL STRIP.AUTO: (no result)
NITRITE UR QL STRIP: NEGATIVE
PH UR: 6 [PH] (ref 5–8)
PROT UR QL STRIP: NEGATIVE
PROT UR QL STRIP: NEGATIVE
RBC # BLD AUTO: 2 /HPF (ref 0–4)
SP GR UR: 1.02 (ref 1.01–1.03)
UROBILINOGEN UR STRIP-MCNC: 0.2 MG/DL (ref 0.2–1)
WBC # UR AUTO: 11 /HPF (ref 0–5)

## 2019-12-04 PROCEDURE — 3E0233Z INTRODUCTION OF ANTI-INFLAMMATORY INTO MUSCLE, PERCUTANEOUS APPROACH: ICD-10-PCS

## 2019-12-04 NOTE — PDOC
Rapid Medical Evaluation


Time Seen by Provider: 12/04/19 20:10


Medical Evaluation: 


 Allergies











Allergy/AdvReac Type Severity Reaction Status Date / Time


 


pineapple [Pineapple] Allergy  Swelling Verified 10/04/19 13:14


 


wool Allergy  Rash Uncoded 10/04/19 13:14


 


Tape AdvReac  Rash Uncoded 10/04/19 13:14











12/04/19 20:10


Patient c/o: urinary freq, left flank pain, feels as if she is passing stones


Patient on brief exam: vss, urine specimen clear


Patient ordered for: ua, ucx


Patient to proceed to the ED





**Discharge Disposition





- Diagnosis


 UTI (urinary tract infection)








- Discharge Dispostion


Disposition: HOME


Condition at time of disposition: Stable





- Prescriptions


Prescriptions: 


Cephalexin Monohydrate [Keflex -] 500 mg PO BID #14 capsule





- Referrals


Referrals: 


[facility] [place] [Provider Group]


Sheldon Ferrera MD [Staff Physician] - 





- Patient Instructions


Additional Instructions: 


Take Keflex 500 mg twice a day until all medication has been completed.


You may want to eat daily yogurt or take probiotics to prevent a yeast infection


Your gonorrhea and Chlamydia testing will take 2 to 3 days to get the results.  

We will call you for a positive result.


You been given the number for a urologist.  Call to schedule an appointment for 

reevaluation.


Return to the emergency department for any worsening pain, fevers, blood in 

your urine or for any other concerns.


Thank you very much for choosing us to provide your emergent health care needs.





- Post Discharge Activity

## 2019-12-04 NOTE — PDOC
History of Present Illness





- General


Chief Complaint: Urinary Problem


Stated Complaint: POSSIBLE UTI


Time Seen by Provider: 19 20:10


History Source: Patient


Exam Limitations: No Limitations





- History of Present Illness


Travel History: No


Initial Comments: 





19 21:46





HISTORY OF PRESENT ILLNESS: 38-year-old woman with past medical history of 

kidney stones requiring multiple stents and double ureters who presents to the 

emergency department for evaluation of bilateral flank pain left greater than 

right, dysuria and urinary frequency over the past 2 days.  Patient reports 

when she voided this morning she felt some irritation while voiding and 

believes she may have passed a kidney stone.  Patient endorses mild nausea.  

Patient denies any vaginal itching or vaginal bleeding.  Patient is having 

unprotected sex with one male partner who is with the patient and denies any 

symptoms.  Patient denies fevers, chills, vomiting.





No recent travel or sick contacts. 


PAST MEDICAL HISTORY: See HPI


SURGICAL HISTORY: Denies


ALLERGIES: No known drug allergies





REVIEW OF SYSTEMS


General/Constitutional: Denies fever or chills. Denies weakness, weight change.


HEENT: Denies change in vision. Denies ear pain or discharge. Denies sore 

throat.


Cardiovascular: Denies chest pain or shortness of breath.


Respiratory: Denies cough, wheezing, or hemoptysis.


Gastrointestinal: Denies nausea, vomiting, diarrhea or constipation. Denies 

rectal bleeding.


Genitourinary: See HPI


Musculoskeletal: Denies joint or muscle swelling or pain. Denies neck or back 

pain.


Skin and breasts: Denies rash or easy bruising.


Neurologic: Denies headache, vertigo, loss of consciousness, or loss of 

sensation.


Psychiatric: Denies depression or anxiety.


Endocrine: Denies increased thirst. Denies abnormal weight change.


Hematologic/Lymphatic: Denies anemia, easy bleeding, or history of blood clots.


Allergic/Immunologic: Denies hives or skin allergy. Denies latex allergy.











PHYSICAL EXAM


General Appearance: Well-appearing, appropriately dressed.  No apparent distress

, no intoxication.


Respiratory/Chest: Lungs CTAB.  No shortness of breath, chest tenderness, 

respiratory distress, accessory muscle use. No crackles, rales, rhonchi, stridor

, wheezing, dullness


Cardiovascular: RRR. S1, S2.  No JVD, murmur, bradycardia, tachycardia.


Vascular Pulses: Dorsalis-Pedis (R): 2+, Dorsalis-Pedis (L): 2+


Gastrointestinal/Abdominal: Normal bowel sounds. Abdomen soft, non-distended.   

Suprapubic tenderness.  No rebound tenderness. No organomegaly, pulsatile mass, 

guarding, hernia, hepatomegaly, splenomegaly.


Lymphatic: No adenopathy, tenderness.


Musculoskeletal/Extremities:  Normal inspection. FROM of all extremities, 

normal capillary refill.  Pelvis Stable.  Left CVA tenderness. No tenderness to 

extremities, pedal edema, swelling, erythema or deformity.








Past History





- Past Medical History


Allergies/Adverse Reactions: 


 Allergies











Allergy/AdvReac Type Severity Reaction Status Date / Time


 


pineapple [Pineapple] Allergy  Swelling Verified 10/04/19 13:14


 


wool Allergy  Rash Uncoded 10/04/19 13:14


 


Tape AdvReac  Rash Uncoded 10/04/19 13:14











Home Medications: 


Ambulatory Orders





Cyclobenzaprine HCl [Flexeril 10 mg] 10 mg PO HS #9 tablet 10/04/19 


Ibuprofen [Motrin -] 600 mg PO QID #28 tablet 10/04/19 


Cephalexin Monohydrate [Keflex -] 500 mg PO BID #14 capsule 19 








Anemia: No


Asthma: Yes


Cancer: No


Cardiac Disorders: No


CVA: No


COPD: No (PLEURISY)


CHF: No


Dementia: No


Diabetes: No


GI Disorders: No


 Disorders: Yes (RENAL COLIC, URINARY STENTS W/ REMOVAL)


HTN: No


Hypercholesterolemia: No


Kidney Stones: Yes


Liver Disease: No


Seizures: No


Thyroid Disease: No





- Surgical History


Abdominal Surgery: No


Appendectomy: No


Cardiac Surgery: No


Cholecystectomy: Yes


Lung Surgery: No


Neurologic Surgery: No


Orthopedic Surgery: No





- Reproductive History


 (#): 7


Para: 6


Therapeutic (s) & number: No


Spontaneous : 0


Uterine Fibroids: Yes (upset at mention of the word hysterectomy. I went to 

further description of)





- Immunization History


Immunization Up to Date: Yes





- Psycho Social/Smoking Cessation Hx


Smoking Status: Yes


Smoking History: Never smoked


Have you smoked in the past 12 months: No


Number of Cigarettes Smoked Daily: 0


'Breaking Loose' booklet given: 13


Hx Alcohol Use: No


Drug/Substance Use Hx: No


Substance Use Type: None


Hx Substance Use Treatment: No





*Physical Exam





- Vital Signs


 Last Vital Signs











Temp Pulse Resp BP Pulse Ox


 


 98.7 F   93 H  19   133/71   100 


 


 19 20:10  19 20:10  19 20:10  19 20:10  19 20:10














ED Treatment Course





- ADDITIONAL ORDERS


Additional order review: 


 Laboratory  Results











  19





  20:20 20:20


 


Urine Color  Yellow 


 


Urine Appearance  Clear 


 


Urine pH  6.0 


 


Ur Specific Gravity  1.016 


 


Urine Protein  Negative 


 


Urine Glucose (UA)  Negative 


 


Urine Ketones  Negative 


 


Urine Blood  Negative 


 


Urine Nitrite  Negative 


 


Urine Bilirubin  Negative 


 


Urine Urobilinogen  0.2 


 


Ur Leukocyte Esterase  1+ H 


 


Urine WBC (Auto)  11 


 


Urine RBC (Auto)  2 


 


Urine Casts (Auto)  10 


 


U Epithel Cells (Auto)  4.6 


 


Urine Bacteria (Auto)  210.1 


 


Urine HCG, Qual   Negative














- RADIOLOGY


Radiology Studies Ordered: 














 Category Date Time Status


 


 SPIRAL- RENAL-STONE CT [CT] Stat CT Scan  19 21:39 Ordered














Medical Decision Making





- Medical Decision Making





19 21:49


A/P:


38-year-old woman with flank pain and dysuria for 2 days





Patient mildly tachycardic with a heart rate of 97 otherwise vital signs are 

normal with out fever.


Left CVA tenderness noted





Given patient's history and will get CT of the abdomen and pelvis to rule out 

kidney stone


Urinalysis, urine culture, urine pregnancy, urine GC


Toradol 30 mg IM now


Reassess


19 23:23


CT scan is read by Dr. Santana: Bilateral renal nonobstructing stones.  No gross 

hydroureteronephrosis or ureteral stone is identified bilaterally.


Status post cholecystectomy.  Surgical metallic clips seen again.


Small fat-containing umbilical hernia.


No free air or free fluid in the abdomen and pelvis.





Patient with weak evidence of urinary tract infection but given findings I will 

treat.  We will give patient referral for urology to follow-up and continue to 

monitor her stones.





I discussed the physical exam findings, ancillary test results and final 

diagnoses with the patient. I answered all of the patient's questions. The 

patient was satisfied with the care received and felt comfortable with the 

discharge plan and treatment plan. The patient will call their primary care 

physician within 24 hours to arrange follow-up and will return to the Emergency 

Department with any new, persistent or worsening symptoms. 





Discharge





- Discharge Information


Problems reviewed: Yes


Clinical Impression/Diagnosis: 


UTI (urinary tract infection)


Qualifiers:


 Urinary tract infection type: acute cystitis Hematuria presence: without 

hematuria Qualified Code(s): N30.00 - Acute cystitis without hematuria





Condition: Stable


Disposition: HOME





- Admission


No





- Additional Discharge Information


Prescriptions: 


Cephalexin Monohydrate [Keflex -] 500 mg PO BID #14 capsule





- Follow up/Referral


Referrals: 


Sheldon Ferrera MD [Staff Physician] - 


[facility] [place] [Provider Group]





- Patient Discharge Instructions


Additional Instructions: 


Take Keflex 500 mg twice a day until all medication has been completed.


You may want to eat daily yogurt or take probiotics to prevent a yeast infection


Your gonorrhea and Chlamydia testing will take 2 to 3 days to get the results.  

We will call you for a positive result.


You been given the number for a urologist.  Call to schedule an appointment for 

reevaluation.


Return to the emergency department for any worsening pain, fevers, blood in 

your urine or for any other concerns.


Thank you very much for choosing us to provide your emergent health care needs.





- Post Discharge Activity

## 2021-04-02 PROBLEM — Z00.00 ENCOUNTER FOR PREVENTIVE HEALTH EXAMINATION: Status: ACTIVE | Noted: 2021-04-02

## 2021-06-10 ENCOUNTER — APPOINTMENT (OUTPATIENT)
Dept: ENDOCRINOLOGY | Facility: CLINIC | Age: 41
End: 2021-06-10

## 2021-11-25 ENCOUNTER — HOSPITAL ENCOUNTER (EMERGENCY)
Dept: HOSPITAL 74 - JER | Age: 41
Discharge: HOME | End: 2021-11-25
Payer: COMMERCIAL

## 2021-11-25 VITALS — HEART RATE: 102 BPM | DIASTOLIC BLOOD PRESSURE: 98 MMHG | TEMPERATURE: 97.8 F | SYSTOLIC BLOOD PRESSURE: 155 MMHG

## 2021-11-25 VITALS — BODY MASS INDEX: 42.9 KG/M2

## 2021-11-25 DIAGNOSIS — E11.9: Primary | ICD-10-CM

## 2021-11-25 DIAGNOSIS — Z76.0: ICD-10-CM

## 2023-08-02 ENCOUNTER — TRANSCRIPTION ENCOUNTER (OUTPATIENT)
Age: 43
End: 2023-08-02

## 2025-02-15 ENCOUNTER — OFFICE (OUTPATIENT)
Dept: URBAN - METROPOLITAN AREA CLINIC 86 | Facility: CLINIC | Age: 45
Setting detail: OPHTHALMOLOGY
End: 2025-02-15
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
## 2025-02-15 PROCEDURE — NO SHOW FE NO SHOW FEE
